# Patient Record
Sex: MALE | Race: WHITE | URBAN - METROPOLITAN AREA
[De-identification: names, ages, dates, MRNs, and addresses within clinical notes are randomized per-mention and may not be internally consistent; named-entity substitution may affect disease eponyms.]

---

## 2022-08-03 ENCOUNTER — EMERGENCY (EMERGENCY)
Facility: HOSPITAL | Age: 52
LOS: 0 days | Discharge: HOME | End: 2022-08-03
Attending: EMERGENCY MEDICINE | Admitting: EMERGENCY MEDICINE

## 2022-08-03 VITALS
DIASTOLIC BLOOD PRESSURE: 65 MMHG | TEMPERATURE: 98 F | SYSTOLIC BLOOD PRESSURE: 122 MMHG | HEART RATE: 71 BPM | RESPIRATION RATE: 18 BRPM | WEIGHT: 220.02 LBS | OXYGEN SATURATION: 99 %
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
## 2022-08-03 DIAGNOSIS — M79.89 OTHER SPECIFIED SOFT TISSUE DISORDERS: ICD-10-CM

## 2022-08-03 DIAGNOSIS — M79.631 PAIN IN RIGHT FOREARM: ICD-10-CM

## 2022-08-03 DIAGNOSIS — E11.9 TYPE 2 DIABETES MELLITUS WITHOUT COMPLICATIONS: ICD-10-CM
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
## 2022-08-03 DIAGNOSIS — M79.601 PAIN IN RIGHT ARM: ICD-10-CM
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
## 2022-08-03 DIAGNOSIS — E78.5 HYPERLIPIDEMIA, UNSPECIFIED: ICD-10-CM

## 2022-08-03 PROCEDURE — 73070 X-RAY EXAM OF ELBOW: CPT | Mod: 26,RT

## 2022-08-03 PROCEDURE — 99284 EMERGENCY DEPT VISIT MOD MDM: CPT

## 2022-08-03 PROCEDURE — 73090 X-RAY EXAM OF FOREARM: CPT | Mod: 26,RT

## 2022-08-03 PROCEDURE — 73100 X-RAY EXAM OF WRIST: CPT | Mod: 26,RT

## 2022-08-03 RX ORDER — IBUPROFEN 200 MG
600 TABLET ORAL ONCE
Refills: 0 | Status: COMPLETED | OUTPATIENT
Start: 2022-08-03 | End: 2022-08-03

## 2022-08-03 RX ADMIN — Medication 600 MILLIGRAM(S): at 12:30

## 2022-08-03 NOTE — ED ADULT NURSE REASSESSMENT NOTE - NS ED NURSE REASSESS COMMENT FT1
pt assess by RN, has deformity to the right forearm area. radial and brachial pulses palpated and intact. pt is alert and oriented, denies A/C use. pt assess by RN, has deformity to the right forearm area. radial and brachial pulses palpated and intact. pt is alert and oriented, denies A/C use. pt was evaluated by critical care MD and PA.

## 2022-08-03 NOTE — ED PROVIDER NOTE - CLINICAL SUMMARY MEDICAL DECISION MAKING FREE TEXT BOX
50 yo M presented to ED for R ar crush injury. No fracture on xray. discussed strict return precautions and signs of compartment syndrome. pt understands. dc home

## 2022-08-03 NOTE — ED ADULT NURSE NOTE - OBJECTIVE STATEMENT
pt came to ED after getting his right arm crushed by a forklift while at work. pt denies A/C use, denies head injury. deformity and swelling noted to right forearm. (+)peripheral pulses to extremity noted.

## 2022-08-03 NOTE — ED PROVIDER NOTE - PATIENT PORTAL LINK FT
You can access the FollowMyHealth Patient Portal offered by [facility] by registering at the following website: [email or web address]. By joining [facility]’s FollowMyHealth portal, you will also be able to view your health information using other applications (apps) compatible with our system.

## 2022-08-03 NOTE — ED PROVIDER NOTE - NS ED ATTENDING STATEMENT MOD
This was a shared visit with the EVY. I reviewed and verified the documentation and independently performed the documented:

## 2022-08-03 NOTE — ED ADULT TRIAGE NOTE - CHIEF COMPLAINT QUOTE
Presented to Ed c/o left arm pain and swelling s/p arm pinned against wall by fork lift while at work. Able to move extremity.

## 2022-08-03 NOTE — ED PROVIDER NOTE - PHYSICAL EXAMINATION
CONST: Well appearing in NAD  EYES: Sclera and conjunctiva clear.   ENT: No nasal discharge. Oropharynx normal appearing, no erythema or exudates. No abscess or swelling. Uvula midline.   NECK: Non-tender, no meningeal signs. normal ROM. supple   CARD: S1 S2; No jvd  RESP: Equal BS B/L, No wheezes, rhonchi or rales. No distress  GI: Soft, non-tender, non-distended. normal BS  MS: Normal ROM in all extremities. pulses 2 +. no calf tenderness or swelling  SKIN: Superficial swelling to R forearm  NEURO: A&Ox3, No focal deficits. Strength 5/5 with no sensory deficits.

## 2022-08-03 NOTE — ED PROVIDER NOTE - CARE PROVIDER_API CALL
David Covington (MD)  Orthopaedic Surgery  [address] [place] [address]  Phone: [phone]  Fax: [phone]  Follow Up Time:

## 2022-08-03 NOTE — ED PROVIDER NOTE - ATTENDING APP SHARED VISIT CONTRIBUTION OF CARE
51-year-old male with past medical history of diabetes type 2 and hyperlipidemia presents to ED for crush injury of his right forearm.  Patient was at work when he states a forklift crushed him between a wall.  Patient's coworker move the forklift and freed his arm.  Patient went to urgent care but did not have x-rays presented to the emergency department.  Patient is having some swelling and pain to his right forearm.  But no numbness no tingling no difficulty with movement.  Other injuries.    Const: NAD  Eyes: PERRL, no conjunctival injection  HENT:  Neck supple without meningismus   CV: RRR, Warm, well-perfused extremities  RESP: CTA B/L, no tachypnea   MSK: Swelling to R forearm. tenderness to palpation over forearm. no tenderness or wrist, elbow. normal movement of wrist and elbow   Skin: Warm, dry. No rashes  Neuro: Alert, CNs II-XII grossly intact. Sensation and motor function of extremities grossly intact.  Psych: Appropriate mood and affect.    motrin for pain and xray

## 2022-08-03 NOTE — ED PROVIDER NOTE - OBJECTIVE STATEMENT
51y M pmh DM presents for eval of R arm injury. Pt was working when his R arm became wedged between the wall and a forklift, since has mild aching R forearm pain with swelling, improved with cold compress, no aggravating factors. Denies numbness, weakness, discoloration

## 2022-08-03 NOTE — ED PROVIDER NOTE - NSFOLLOWUPINSTRUCTIONS_ED_ALL_ED_FT
Follow up with PMD and Orthopedics in 1-2 days.      Compartment Syndrome    WHAT YOU NEED TO KNOW:    What is compartment syndrome? Compartment syndrome happens when swelling or bleeding increases pressure in and between muscles. This stops blood from flowing to the area and causes muscle and nerve damage. Compartment syndrome usually happens in an arm or leg. Symptoms start suddenly and get worse quickly. Without immediate treatment, damage may become severe and permanent.     What increases my risk for compartment syndrome?   •Direct pressure, such as from a bandage or cast that is too tight      •An injury that causes swelling or bleeding, such as broken bones, burns, a crush injury, or allergic reactions      •Certain medicines that increase your risk for bleeding or pressure buildup      •Medicines or IV drugs that are injected into the veins      •Surgery that caused your leg to be placed above the level of your heart for a long period of time      What are the signs and symptoms of compartment syndrome?   •Severe pain that seems worse than you would expect for the injury, or that does not get better with pain medicine      •Pain that increases when you rest the area or stretch or bend the area      •Swelling, tightness, or hardness of the skin in the area that was injured      •Pale or shiny skin near your injury      •Weak muscles in the area, or trouble moving your injured arm or leg      •Numbness, tingling, or burning in the area      How is compartment syndrome diagnosed and treated? Your healthcare provider will examine the area where you are having pain. The provider may also measure the pressure in this area. You may need to have a cast or bandage loosened or removed. This will help decrease pressure in your muscles. You may also need any of the following:  •Prescription pain medicine may be given. Ask your healthcare provider how to take this medicine safely. Some prescription pain medicines contain acetaminophen. Do not take other medicines that contain acetaminophen without talking to your healthcare provider. Too much acetaminophen may cause liver damage. Prescription pain medicine may cause constipation. Ask your healthcare provider how to prevent or treat constipation.       •Hyperbaric oxygen therapy is used to get more oxygen into your body. The oxygen is given under pressure to help it get into your tissues and blood. You may need to have this therapy more than once.      •Surgery may be needed to decrease pain, pressure, and swelling.       What can I do to prevent compartment syndrome?   •Elevate your arm or leg after an injury. Raise your arm or leg at the level of your heart as long as directed. This will help decrease swelling and pain. Do not raise your arm or leg higher than your heart. Prop it on pillows or blankets to keep it elevated.       •Check for proper fit. Make sure a brace or bandage you get after an injury is not too tight. You should be able to fit 2 fingers between your skin and the brace or bandage.       When should I seek immediate care?   •Your pain or swelling does not go away or gets worse, even after you take medicine.      •Your injured arm or leg turns blue or white or feels cold and numb.      •Blood soaks through your bandage or cast.      •Your wound is draining pus or smells bad.       When should I contact my healthcare provider?   •You have a fever.       •You have more swelling than you did before a cast, brace, or bandage was put on.      •Your skin is itchy and swollen, or you have a rash.      •You have questions or concerns about your condition or care.      CARE AGREEMENT:    You have the right to help plan your care. Learn about your health condition and how it may be treated. Discuss treatment options with your healthcare providers to decide what care you want to receive. You always have the right to refuse treatment